# Patient Record
Sex: FEMALE | Race: WHITE
[De-identification: names, ages, dates, MRNs, and addresses within clinical notes are randomized per-mention and may not be internally consistent; named-entity substitution may affect disease eponyms.]

---

## 2018-12-04 ENCOUNTER — HOSPITAL ENCOUNTER (OUTPATIENT)
Dept: HOSPITAL 61 - SURGPAT | Age: 58
Discharge: HOME | End: 2018-12-04
Attending: ORTHOPAEDIC SURGERY
Payer: COMMERCIAL

## 2018-12-04 DIAGNOSIS — Z01.818: Primary | ICD-10-CM

## 2018-12-04 DIAGNOSIS — N95.0: ICD-10-CM

## 2018-12-04 LAB
ALBUMIN SERPL-MCNC: 3.6 G/DL (ref 3.4–5)
ANION GAP SERPL CALC-SCNC: 10 MMOL/L (ref 6–14)
APTT BLD: 26 SEC (ref 24–38)
APTT PPP: YELLOW S
BACTERIA #/AREA URNS HPF: (no result) /HPF
BASOPHILS # BLD AUTO: 0.1 X10^3/UL (ref 0–0.2)
BASOPHILS NFR BLD: 1 % (ref 0–3)
BILIRUB UR QL STRIP: NEGATIVE
BUN SERPL-MCNC: 18 MG/DL (ref 7–20)
CALCIUM SERPL-MCNC: 9.3 MG/DL (ref 8.5–10.1)
CHLORIDE SERPL-SCNC: 105 MMOL/L (ref 98–107)
CO2 SERPL-SCNC: 27 MMOL/L (ref 21–32)
CREAT SERPL-MCNC: 0.9 MG/DL (ref 0.6–1)
EOSINOPHIL NFR BLD: 0.2 X10^3/UL (ref 0–0.7)
EOSINOPHIL NFR BLD: 3 % (ref 0–3)
ERYTHROCYTE [DISTWIDTH] IN BLOOD BY AUTOMATED COUNT: 14.3 % (ref 11.5–14.5)
FIBRINOGEN PPP-MCNC: CLEAR MG/DL
GFR SERPLBLD BASED ON 1.73 SQ M-ARVRAT: 63.9 ML/MIN
GLUCOSE SERPL-MCNC: 113 MG/DL (ref 70–99)
HBA1C MFR BLD: 6 % (ref 4.8–5.6)
HCT VFR BLD CALC: 40.6 % (ref 36–47)
HGB BLD-MCNC: 14 G/DL (ref 12–15.5)
HYALINE CASTS #/AREA URNS LPF: (no result) /HPF
LYMPHOCYTES # BLD: 1.4 X10^3/UL (ref 1–4.8)
LYMPHOCYTES NFR BLD AUTO: 22 % (ref 24–48)
MCH RBC QN AUTO: 30 PG (ref 25–35)
MCHC RBC AUTO-ENTMCNC: 35 G/DL (ref 31–37)
MCV RBC AUTO: 86 FL (ref 79–100)
MONO #: 0.4 X10^3/UL (ref 0–1.1)
MONOCYTES NFR BLD: 6 % (ref 0–9)
NEUT #: 4.4 X10^3UL (ref 1.8–7.7)
NEUTROPHILS NFR BLD AUTO: 69 % (ref 31–73)
NITRITE UR QL STRIP: NEGATIVE
PH UR STRIP: 6 [PH]
PLATELET # BLD AUTO: 204 X10^3/UL (ref 140–400)
POTASSIUM SERPL-SCNC: 4.3 MMOL/L (ref 3.5–5.1)
PROT UR STRIP-MCNC: NEGATIVE MG/DL
PROTHROMBIN TIME: 12.9 SEC (ref 11.7–14)
RBC # BLD AUTO: 4.72 X10^6/UL (ref 3.5–5.4)
RBC #/AREA URNS HPF: (no result) /HPF (ref 0–2)
SODIUM SERPL-SCNC: 142 MMOL/L (ref 136–145)
SQUAMOUS #/AREA URNS LPF: (no result) /LPF
UROBILINOGEN UR-MCNC: 0.2 MG/DL
WBC # BLD AUTO: 6.4 X10^3/UL (ref 4–11)
WBC #/AREA URNS HPF: (no result) /HPF (ref 0–4)

## 2018-12-04 PROCEDURE — 85730 THROMBOPLASTIN TIME PARTIAL: CPT

## 2018-12-04 PROCEDURE — 83036 HEMOGLOBIN GLYCOSYLATED A1C: CPT

## 2018-12-04 PROCEDURE — 85651 RBC SED RATE NONAUTOMATED: CPT

## 2018-12-04 PROCEDURE — 87641 MR-STAPH DNA AMP PROBE: CPT

## 2018-12-04 PROCEDURE — 81001 URINALYSIS AUTO W/SCOPE: CPT

## 2018-12-04 PROCEDURE — 82040 ASSAY OF SERUM ALBUMIN: CPT

## 2018-12-04 PROCEDURE — 85610 PROTHROMBIN TIME: CPT

## 2018-12-04 PROCEDURE — 80048 BASIC METABOLIC PNL TOTAL CA: CPT

## 2018-12-04 PROCEDURE — 71046 X-RAY EXAM CHEST 2 VIEWS: CPT

## 2018-12-04 PROCEDURE — 36415 COLL VENOUS BLD VENIPUNCTURE: CPT

## 2018-12-04 PROCEDURE — 85025 COMPLETE CBC W/AUTO DIFF WBC: CPT

## 2018-12-04 PROCEDURE — 93005 ELECTROCARDIOGRAM TRACING: CPT

## 2018-12-04 NOTE — EKG
[facility]

              [address] [place] [address]

Test Date:    2018               Test Time:    10:49:58

Pat Name:     MIRANDA RAMIREZ            Department:   

Patient ID:   [id number]           Room:          

Gender:       F                        Technician:   KUSUM LAMA

:          1960               Requested By: DARYL DELGADO

Order Number: [id number]           Reading MD:   Moises Ramirez

                                 Measurements

Intervals                              Axis          

Rate:         61                       P:            49

HI:           196                      QRS:          -2

QRSD:         80                       T:            28

QT:           420                                    

QTc:          429                                    

                           Interpretive Statements

SINUS RHYTHM

LEFTWARD AXIS





Electronically Signed On 2018 15:54:03 CST by Moises Ramirez

## 2018-12-04 NOTE — RAD
Two-view chest 12/4/2018

 

CLINICAL INDICATION: Preoperative evaluation for left hip replacement.

 

COMPARISON: None.

 

FINDINGS: Cardiac and mediastinal silhouettes unremarkable. Minimal 

lingular atelectasis or scarring. No pleural effusion or pneumothorax. 

Bilevel thoracic spondylosis.

 

IMPRESSION: No acute cardiopulmonary abnormality.

 

Electronically signed by: Jose Ramon Vázquez MD (12/4/2018 4:00 PM) [id number]

## 2018-12-17 ENCOUNTER — HOSPITAL ENCOUNTER (OUTPATIENT)
Dept: HOSPITAL 61 - LAB | Age: 58
Discharge: HOME | End: 2018-12-17
Attending: ORTHOPAEDIC SURGERY
Payer: COMMERCIAL

## 2018-12-17 DIAGNOSIS — N39.0: Primary | ICD-10-CM

## 2018-12-17 LAB
APTT PPP: YELLOW S
BACTERIA #/AREA URNS HPF: (no result) /HPF
BILIRUB UR QL STRIP: NEGATIVE
FIBRINOGEN PPP-MCNC: CLEAR MG/DL
NITRITE UR QL STRIP: NEGATIVE
PH UR STRIP: 5 [PH]
PROT UR STRIP-MCNC: NEGATIVE MG/DL
RBC #/AREA URNS HPF: (no result) /HPF (ref 0–2)
SQUAMOUS #/AREA URNS LPF: (no result) /LPF
UROBILINOGEN UR-MCNC: 0.2 MG/DL

## 2018-12-17 PROCEDURE — 81001 URINALYSIS AUTO W/SCOPE: CPT
